# Patient Record
Sex: FEMALE | Race: BLACK OR AFRICAN AMERICAN | URBAN - METROPOLITAN AREA
[De-identification: names, ages, dates, MRNs, and addresses within clinical notes are randomized per-mention and may not be internally consistent; named-entity substitution may affect disease eponyms.]

---

## 2024-06-15 ENCOUNTER — APPOINTMENT (OUTPATIENT)
Dept: CT IMAGING | Age: 39
End: 2024-06-15
Payer: COMMERCIAL

## 2024-06-15 ENCOUNTER — APPOINTMENT (OUTPATIENT)
Dept: GENERAL RADIOLOGY | Age: 39
End: 2024-06-15
Payer: COMMERCIAL

## 2024-06-15 ENCOUNTER — HOSPITAL ENCOUNTER (EMERGENCY)
Age: 39
Discharge: LEFT BEFORE DISCHARGE | End: 2024-06-15
Attending: EMERGENCY MEDICINE
Payer: COMMERCIAL

## 2024-06-15 ENCOUNTER — APPOINTMENT (OUTPATIENT)
Dept: ULTRASOUND IMAGING | Age: 39
End: 2024-06-15
Payer: COMMERCIAL

## 2024-06-15 VITALS
RESPIRATION RATE: 18 BRPM | BODY MASS INDEX: 29.49 KG/M2 | HEART RATE: 61 BPM | HEIGHT: 65 IN | OXYGEN SATURATION: 100 % | WEIGHT: 177 LBS | SYSTOLIC BLOOD PRESSURE: 128 MMHG | TEMPERATURE: 98.7 F | DIASTOLIC BLOOD PRESSURE: 84 MMHG

## 2024-06-15 DIAGNOSIS — Z34.90 PREGNANCY, UNSPECIFIED GESTATIONAL AGE: Primary | ICD-10-CM

## 2024-06-15 DIAGNOSIS — R79.89 ELEVATED D-DIMER: ICD-10-CM

## 2024-06-15 DIAGNOSIS — R00.2 PALPITATIONS: ICD-10-CM

## 2024-06-15 LAB
ABO + RH BLD: NORMAL
ALBUMIN SERPL-MCNC: 3.6 G/DL (ref 3.4–5)
ALBUMIN/GLOB SERPL: 0.8 {RATIO} (ref 1.1–2.2)
ALP SERPL-CCNC: 53 U/L (ref 40–129)
ALT SERPL-CCNC: 9 U/L (ref 10–40)
ANION GAP SERPL CALCULATED.3IONS-SCNC: 10 MMOL/L (ref 3–16)
AST SERPL-CCNC: 28 U/L (ref 15–37)
B-HCG SERPL EIA 3RD IS-ACNC: 9794 MIU/ML
BASOPHILS # BLD: 0 K/UL (ref 0–0.2)
BASOPHILS NFR BLD: 0.8 %
BILIRUB SERPL-MCNC: 0.3 MG/DL (ref 0–1)
BILIRUB UR QL STRIP.AUTO: NEGATIVE
BUN SERPL-MCNC: 8 MG/DL (ref 7–20)
CALCIUM SERPL-MCNC: 8.6 MG/DL (ref 8.3–10.6)
CHLORIDE SERPL-SCNC: 102 MMOL/L (ref 99–110)
CLARITY UR: CLEAR
CO2 SERPL-SCNC: 18 MMOL/L (ref 21–32)
COLOR UR: YELLOW
CREAT SERPL-MCNC: 0.6 MG/DL (ref 0.6–1.1)
D-DIMER QUANTITATIVE: 3.46 UG/ML FEU (ref 0–0.6)
DEPRECATED RDW RBC AUTO: 15.6 % (ref 12.4–15.4)
EOSINOPHIL # BLD: 0 K/UL (ref 0–0.6)
EOSINOPHIL NFR BLD: 0.8 %
GFR SERPLBLD CREATININE-BSD FMLA CKD-EPI: >90 ML/MIN/{1.73_M2}
GLUCOSE SERPL-MCNC: 82 MG/DL (ref 70–99)
GLUCOSE UR STRIP.AUTO-MCNC: NEGATIVE MG/DL
HCG SERPL QL: POSITIVE
HCT VFR BLD AUTO: 34.9 % (ref 36–48)
HGB BLD-MCNC: 11.1 G/DL (ref 12–16)
HGB UR QL STRIP.AUTO: NEGATIVE
KETONES UR STRIP.AUTO-MCNC: NEGATIVE MG/DL
LEUKOCYTE ESTERASE UR QL STRIP.AUTO: NEGATIVE
LIPASE SERPL-CCNC: 27 U/L (ref 13–60)
LYMPHOCYTES # BLD: 1.6 K/UL (ref 1–5.1)
LYMPHOCYTES NFR BLD: 31.9 %
MCH RBC QN AUTO: 25.8 PG (ref 26–34)
MCHC RBC AUTO-ENTMCNC: 32 G/DL (ref 31–36)
MCV RBC AUTO: 80.9 FL (ref 80–100)
MONOCYTES # BLD: 0.7 K/UL (ref 0–1.3)
MONOCYTES NFR BLD: 13.4 %
NEUTROPHILS # BLD: 2.7 K/UL (ref 1.7–7.7)
NEUTROPHILS NFR BLD: 53.1 %
NITRITE UR QL STRIP.AUTO: NEGATIVE
NT-PROBNP SERPL-MCNC: 104 PG/ML (ref 0–124)
PH UR STRIP.AUTO: 6.5 [PH] (ref 5–8)
PLATELET # BLD AUTO: 239 K/UL (ref 135–450)
PMV BLD AUTO: 8.1 FL (ref 5–10.5)
POTASSIUM SERPL-SCNC: 4.4 MMOL/L (ref 3.5–5.1)
PROT SERPL-MCNC: 7.9 G/DL (ref 6.4–8.2)
PROT UR STRIP.AUTO-MCNC: NEGATIVE MG/DL
RBC # BLD AUTO: 4.31 M/UL (ref 4–5.2)
SODIUM SERPL-SCNC: 130 MMOL/L (ref 136–145)
SP GR UR STRIP.AUTO: 1.02 (ref 1–1.03)
TROPONIN, HIGH SENSITIVITY: <6 NG/L (ref 0–14)
TROPONIN, HIGH SENSITIVITY: <6 NG/L (ref 0–14)
UA COMPLETE W REFLEX CULTURE PNL UR: NORMAL
UA DIPSTICK W REFLEX MICRO PNL UR: NORMAL
URN SPEC COLLECT METH UR: NORMAL
UROBILINOGEN UR STRIP-ACNC: 1 E.U./DL
WBC # BLD AUTO: 5 K/UL (ref 4–11)

## 2024-06-15 PROCEDURE — 81003 URINALYSIS AUTO W/O SCOPE: CPT

## 2024-06-15 PROCEDURE — 71045 X-RAY EXAM CHEST 1 VIEW: CPT

## 2024-06-15 PROCEDURE — 76817 TRANSVAGINAL US OBSTETRIC: CPT

## 2024-06-15 PROCEDURE — 86900 BLOOD TYPING SEROLOGIC ABO: CPT

## 2024-06-15 PROCEDURE — 99285 EMERGENCY DEPT VISIT HI MDM: CPT

## 2024-06-15 PROCEDURE — 80053 COMPREHEN METABOLIC PANEL: CPT

## 2024-06-15 PROCEDURE — 83880 ASSAY OF NATRIURETIC PEPTIDE: CPT

## 2024-06-15 PROCEDURE — 84484 ASSAY OF TROPONIN QUANT: CPT

## 2024-06-15 PROCEDURE — 84703 CHORIONIC GONADOTROPIN ASSAY: CPT

## 2024-06-15 PROCEDURE — 86901 BLOOD TYPING SEROLOGIC RH(D): CPT

## 2024-06-15 PROCEDURE — 93005 ELECTROCARDIOGRAM TRACING: CPT | Performed by: EMERGENCY MEDICINE

## 2024-06-15 PROCEDURE — 84702 CHORIONIC GONADOTROPIN TEST: CPT

## 2024-06-15 PROCEDURE — 85379 FIBRIN DEGRADATION QUANT: CPT

## 2024-06-15 PROCEDURE — 85025 COMPLETE CBC W/AUTO DIFF WBC: CPT

## 2024-06-15 PROCEDURE — 83690 ASSAY OF LIPASE: CPT

## 2024-06-15 PROCEDURE — 36415 COLL VENOUS BLD VENIPUNCTURE: CPT

## 2024-06-15 ASSESSMENT — ENCOUNTER SYMPTOMS
VOMITING: 0
ABDOMINAL DISTENTION: 0
COLOR CHANGE: 0
SHORTNESS OF BREATH: 0
DIARRHEA: 0
NAUSEA: 0
RESPIRATORY NEGATIVE: 1
CHEST TIGHTNESS: 0
ABDOMINAL PAIN: 1
CONSTIPATION: 0
COUGH: 0
PHOTOPHOBIA: 0
BACK PAIN: 0

## 2024-06-15 ASSESSMENT — PAIN SCALES - GENERAL: PAINLEVEL_OUTOF10: 5

## 2024-06-15 ASSESSMENT — PAIN - FUNCTIONAL ASSESSMENT: PAIN_FUNCTIONAL_ASSESSMENT: 0-10

## 2024-06-15 ASSESSMENT — LIFESTYLE VARIABLES
HOW OFTEN DO YOU HAVE A DRINK CONTAINING ALCOHOL: NEVER
HOW MANY STANDARD DRINKS CONTAINING ALCOHOL DO YOU HAVE ON A TYPICAL DAY: PATIENT DOES NOT DRINK

## 2024-06-15 NOTE — ED PROVIDER NOTES
[facility] Emergency Department      Pt Name: Alice Greenberg  MRN: [id number]  Birthdate 1985  Date of evaluation: 6/15/2024  Provider: OSWALDO LUNA MD  I personally saw Alice Greenberg and made and approved the management plan with the advanced practice provider.  I take responsibility for the patient management.     HPI: Alice Greenberg presented with   Chief Complaint   Patient presents with    Abdominal Pain     Pt states that she has had abdominal pain for 3 days, no n/v/c/d. Pt states sharp upper abd pain with touch.     Palpitations     Pt states that she has palpitations that has been going on for 3 days as well.      Alice Greenberg has no past medical history on file.  She has no past surgical history on file.    No current facility-administered medications on file prior to encounter.     No current outpatient medications on file prior to encounter.     PHYSICAL EXAM  Vitals: /84   Pulse 61   Temp 98.7 °F (37.1 °C) (Oral)   Resp 18   Ht 1.651 m (5' 5\")   Wt 80.3 kg (177 lb)   LMP 04/14/2024   SpO2 100%   BMI 29.45 kg/m²   Constitutional:  38 y.o. female alert  HENT:  Atraumatic, oral mucosa moist  Neck:  No visible JVD, supple  Chest/Lungs:  Respiratory effort normal, clear, regular  Abdomen:  Non-distended, soft, NT  Back:  No gross deformity  Extremities:  Normal tone and perfusion, no swelling    Medical Decision Making: Briefly, this is an 38 y.o.female who presented with multiple complaints.  She is found to be in first trimester of pregnancy.  She has abnormal D-dimer.  We recommended CT imaging to further assess for the possibility of blood clot.  She also has abnormal x-ray findings of her lung.  She does not have clinical findings suggestive of heart failure.  The patient and her significant other refused the CT scan.  The patient has decided to leave against medical advice.  She has normal mental status and adequate capacity to make medical decisions.  The  was performed with real-time imaging, color flow Doppler imaging, and spectral analysis. COMPARISON: None HISTORY: ORDERING SYSTEM PROVIDED HISTORY: Pregnant and Pelvic Pain TECHNOLOGIST PROVIDED HISTORY: Reason for exam:->Pregnant and Pelvic Pain FINDINGS: Uterus: Measures 9.7 x 6.9 x 6.4 cm.  There is a small 0.9 x 0.7 x 0.7 cm fibroid within the anterior uterine body. Cervical length: Not measured. Gestational Sac(s):  There are two separate gestational sacs identified, with the mean sac diameter of gestational sac A measuring 1.3 cm, and the mean sac diameter of gestational sac B measuring 1.3 cm.  This generates a gestational age of 6 weeks 1 day.  However, gestational sac A is somewhat irregular in shape and demonstrates no evidence of an internal yolk sac or fetal pole. Gestational sac B does demonstrate both an internal fetal pole and yolk sac, with positive fetal cardiac flicker, though fetal heart rate could not be measured on M-mode imaging. Yolk Sac:  Present in gestational sac B Fetal Pole:  Present in gestational sac B [place] Rump Length:  4.0 mm Fetal Heart Rate:  Positive fetal cardiac flicker, though fetal heart rate could not be measured Right ovary: Measures 2.2 x 3.1 x 1.6 cm Left ovary: Measures 4.0 x 2.6 x 2.6 cm Both ovaries demonstrate normal arterial and venous doppler flow, without evidence of torsion, mass, or ectopic pregnancy. Free fluid: None Measurements: Estimated gestational age by current ultrasound: 6 weeks 1 day Estimated gestational age by LMP/prior ultrasound: 5 weeks 5 days Estimated Due Date: 02/07/2025     1. Two separate intrauterine gestational sacs, with gestational sac A being empty and containing no evidence of an internal fetal pole or yolk sac at this time.  Gestational sac B contains both a fetal pole and yolk sac, with the fetal pole demonstrating positive fetal cardiac flicker, though fetal heart rate could not be measured.  The gestational age measures 6 weeks 1

## 2024-06-15 NOTE — ED PROVIDER NOTES
[facility] EMERGENCY DEPARTMENT  EMERGENCY DEPARTMENT ENCOUNTER        Pt Name: Alice Greenberg  MRN: [id number]  Birthdate 1985  Date of evaluation: 6/15/2024  Provider: ETELVINA Ricci  PCP: No primary care provider on file.  Note Started: 12:39 PM EDT 6/15/24       I have seen and evaluated this patient with my supervising physician Katya Oscar,*.      CHIEF COMPLAINT       Chief Complaint   Patient presents with    Abdominal Pain     Pt states that she has had abdominal pain for 3 days, no n/v/c/d. Pt states sharp upper abd pain with touch.     Palpitations     Pt states that she has palpitations that has been going on for 3 days as well.        HISTORY OF PRESENT ILLNESS: 1 or more Elements     History From: Patient  Limitations to history : Language Czech    Alice Greenberg is a 38 y.o. female with no significant past medical history who presents ED with complaint of upper abdominal pain.  She complained of epigastric abdominal pain for the past 3 days.  She reports she has had some palpitations or racing heartbeat.  States she feels dizzy but cannot tell me what she means when she feels dizzy.  She denies any room spinning sensation or feelings of syncope/lightheadedness.  She denies headache.  Denies visual changes or speech disturbances.  Denies any numbness or tingling.  States she could be pregnant.  Denies vaginal bleeding or discharge.  Denies urinary symptoms or changes in bowel movements.  Denies any chest pain or shortness of breath.  Denies any recent travel, trips, surgery or immobilization.  Became concerned and came to the ED for further evaluation and treatment    Nursing Notes were all reviewed and agreed with or any disagreements were addressed in the HPI.    REVIEW OF SYSTEMS :      Review of Systems   Constitutional:  Negative for activity change, appetite change, chills, diaphoresis, fatigue and fever.   Eyes:  Negative for photophobia and  deficit, dysarthria or facial asymmetry.      Sensory: Sensation is intact. No sensory deficit.      Motor: Motor function is intact. No weakness or tremor.      Coordination: Coordination is intact. Coordination normal.      Gait: Gait is intact. Gait normal.   Psychiatric:         Behavior: Behavior normal.         DIAGNOSTIC RESULTS   LABS:    Labs Reviewed   CBC WITH AUTO DIFFERENTIAL - Abnormal; Notable for the following components:       Result Value    Hemoglobin 11.1 (*)     Hematocrit 34.9 (*)     MCH 25.8 (*)     RDW 15.6 (*)     All other components within normal limits   COMPREHENSIVE METABOLIC PANEL W/ REFLEX TO MG FOR LOW K - Abnormal; Notable for the following components:    Sodium 130 (*)     CO2 18 (*)     Albumin/Globulin Ratio 0.8 (*)     ALT 9 (*)     All other components within normal limits   D-DIMER, QUANTITATIVE - Abnormal; Notable for the following components:    D-Dimer, Quant 3.46 (*)     All other components within normal limits   TROPONIN   BRAIN NATRIURETIC PEPTIDE   URINALYSIS WITH REFLEX TO CULTURE   HCG, SERUM, QUALITATIVE   LIPASE   HCG, QUANTITATIVE, PREGNANCY   TROPONIN   ABO/RH       When ordered only abnormal lab results are displayed. All other labs were within normal range or not returned as of this dictation.    EKG: When ordered, EKG's are interpreted by the Emergency Department Physician in the absence of a cardiologist.  Please see their note for interpretation of EKG.    RADIOLOGY:   Non-plain film images such as CT, Ultrasound and MRI are read by the radiologist. Plain radiographic images are visualized and preliminarily interpreted by the ED Provider with the below findings:    Interpretation per the Radiologist below, if available at the time of this note:    US OB TRANSVAGINAL   Final Result   1. Two separate intrauterine gestational sacs, with gestational sac A being   empty and containing no evidence of an internal fetal pole or yolk sac at   this time.  Gestational

## 2024-06-16 LAB
EKG ATRIAL RATE: 67 BPM
EKG DIAGNOSIS: NORMAL
EKG P AXIS: 39 DEGREES
EKG P-R INTERVAL: 164 MS
EKG Q-T INTERVAL: 398 MS
EKG QRS DURATION: 84 MS
EKG QTC CALCULATION (BAZETT): 420 MS
EKG R AXIS: 13 DEGREES
EKG T AXIS: 16 DEGREES
EKG VENTRICULAR RATE: 67 BPM

## 2024-06-16 PROCEDURE — 93010 ELECTROCARDIOGRAM REPORT: CPT | Performed by: INTERNAL MEDICINE

## 2024-08-08 ENCOUNTER — APPOINTMENT (OUTPATIENT)
Dept: ULTRASOUND IMAGING | Age: 39
End: 2024-08-08
Payer: COMMERCIAL

## 2024-08-08 ENCOUNTER — HOSPITAL ENCOUNTER (EMERGENCY)
Age: 39
Discharge: HOME | End: 2024-08-08
Payer: COMMERCIAL

## 2024-08-08 VITALS
OXYGEN SATURATION: 100 % | HEART RATE: 71 BPM | SYSTOLIC BLOOD PRESSURE: 131 MMHG | RESPIRATION RATE: 18 BRPM | DIASTOLIC BLOOD PRESSURE: 87 MMHG | TEMPERATURE: 97.8 F

## 2024-08-08 DIAGNOSIS — O02.1 MISSED ABORTION: Primary | ICD-10-CM

## 2024-08-08 LAB
ABO + RH BLD: NORMAL
ALBUMIN SERPL-MCNC: 3.8 G/DL (ref 3.4–5)
ALBUMIN/GLOB SERPL: 0.9 {RATIO} (ref 1.1–2.2)
ALP SERPL-CCNC: 54 U/L (ref 40–129)
ALT SERPL-CCNC: 9 U/L (ref 10–40)
ANION GAP SERPL CALCULATED.3IONS-SCNC: 10 MMOL/L (ref 3–16)
AST SERPL-CCNC: 15 U/L (ref 15–37)
B-HCG SERPL EIA 3RD IS-ACNC: 2253 MIU/ML
BACTERIA URNS QL MICRO: ABNORMAL /HPF
BASOPHILS # BLD: 0 K/UL (ref 0–0.2)
BASOPHILS NFR BLD: 0.5 %
BILIRUB SERPL-MCNC: <0.2 MG/DL (ref 0–1)
BILIRUB UR QL STRIP.AUTO: NEGATIVE
BUN SERPL-MCNC: 7 MG/DL (ref 7–20)
CALCIUM SERPL-MCNC: 8.6 MG/DL (ref 8.3–10.6)
CHLORIDE SERPL-SCNC: 101 MMOL/L (ref 99–110)
CLARITY UR: CLEAR
CO2 SERPL-SCNC: 23 MMOL/L (ref 21–32)
COLOR UR: YELLOW
CREAT SERPL-MCNC: <0.5 MG/DL (ref 0.6–1.1)
DEPRECATED RDW RBC AUTO: 14.9 % (ref 12.4–15.4)
EOSINOPHIL # BLD: 0.1 K/UL (ref 0–0.6)
EOSINOPHIL NFR BLD: 2.8 %
EPI CELLS #/AREA URNS AUTO: 1 /HPF (ref 0–5)
GFR SERPLBLD CREATININE-BSD FMLA CKD-EPI: >90 ML/MIN/{1.73_M2}
GLUCOSE SERPL-MCNC: 91 MG/DL (ref 70–99)
GLUCOSE UR STRIP.AUTO-MCNC: NEGATIVE MG/DL
HCT VFR BLD AUTO: 35 % (ref 36–48)
HGB BLD-MCNC: 11.4 G/DL (ref 12–16)
HGB UR QL STRIP.AUTO: ABNORMAL
HYALINE CASTS #/AREA URNS AUTO: 0 /LPF (ref 0–8)
KETONES UR STRIP.AUTO-MCNC: NEGATIVE MG/DL
LEUKOCYTE ESTERASE UR QL STRIP.AUTO: ABNORMAL
LIPASE SERPL-CCNC: 25 U/L (ref 13–60)
LYMPHOCYTES # BLD: 1.4 K/UL (ref 1–5.1)
LYMPHOCYTES NFR BLD: 27 %
MCH RBC QN AUTO: 26.4 PG (ref 26–34)
MCHC RBC AUTO-ENTMCNC: 32.6 G/DL (ref 31–36)
MCV RBC AUTO: 80.9 FL (ref 80–100)
MONOCYTES # BLD: 0.6 K/UL (ref 0–1.3)
MONOCYTES NFR BLD: 10.9 %
NEUTROPHILS # BLD: 3 K/UL (ref 1.7–7.7)
NEUTROPHILS NFR BLD: 58.8 %
NITRITE UR QL STRIP.AUTO: NEGATIVE
PH UR STRIP.AUTO: 6 [PH] (ref 5–8)
PLATELET # BLD AUTO: 198 K/UL (ref 135–450)
PLATELET BLD QL SMEAR: ADEQUATE
PMV BLD AUTO: 8 FL (ref 5–10.5)
POTASSIUM SERPL-SCNC: 4 MMOL/L (ref 3.5–5.1)
PROT SERPL-MCNC: 7.9 G/DL (ref 6.4–8.2)
PROT UR STRIP.AUTO-MCNC: NEGATIVE MG/DL
RBC # BLD AUTO: 4.33 M/UL (ref 4–5.2)
RBC CLUMPS #/AREA URNS AUTO: 62 /HPF (ref 0–4)
SLIDE REVIEW: ABNORMAL
SODIUM SERPL-SCNC: 134 MMOL/L (ref 136–145)
SP GR UR STRIP.AUTO: 1.02 (ref 1–1.03)
UA COMPLETE W REFLEX CULTURE PNL UR: ABNORMAL
UA DIPSTICK W REFLEX MICRO PNL UR: YES
URN SPEC COLLECT METH UR: ABNORMAL
UROBILINOGEN UR STRIP-ACNC: 1 E.U./DL
WBC # BLD AUTO: 5.1 K/UL (ref 4–11)
WBC #/AREA URNS AUTO: 2 /HPF (ref 0–5)

## 2024-08-08 PROCEDURE — 84702 CHORIONIC GONADOTROPIN TEST: CPT

## 2024-08-08 PROCEDURE — 86901 BLOOD TYPING SEROLOGIC RH(D): CPT

## 2024-08-08 PROCEDURE — 80053 COMPREHEN METABOLIC PANEL: CPT

## 2024-08-08 PROCEDURE — 83690 ASSAY OF LIPASE: CPT

## 2024-08-08 PROCEDURE — 99284 EMERGENCY DEPT VISIT MOD MDM: CPT

## 2024-08-08 PROCEDURE — 81001 URINALYSIS AUTO W/SCOPE: CPT

## 2024-08-08 PROCEDURE — 76801 OB US < 14 WKS SINGLE FETUS: CPT

## 2024-08-08 PROCEDURE — 86900 BLOOD TYPING SEROLOGIC ABO: CPT

## 2024-08-08 PROCEDURE — 85025 COMPLETE CBC W/AUTO DIFF WBC: CPT

## 2024-08-08 ASSESSMENT — ENCOUNTER SYMPTOMS
WHEEZING: 0
COUGH: 0
NAUSEA: 0
VOMITING: 0
DIARRHEA: 0
ABDOMINAL PAIN: 1
RHINORRHEA: 0
SHORTNESS OF BREATH: 0

## 2024-08-08 ASSESSMENT — PAIN SCALES - GENERAL: PAINLEVEL_OUTOF10: 7

## 2024-08-08 ASSESSMENT — PAIN - FUNCTIONAL ASSESSMENT: PAIN_FUNCTIONAL_ASSESSMENT: 0-10

## 2024-08-08 NOTE — ED PROVIDER NOTES
with fetal demise.  I did consult with OB/GYN, Dr. Mendiola, who states the patient can follow-up outpatient.  She has an appointment already scheduled for Tuesday.    I see nothing that would suggest an acute abdomen at this time. Based on history, physical exam, risk factors, and tests my suspicion for bowel obstruction, incarcerated hernia, acute pancreatitis, intra-abdominal abscess, perforated viscus, diverticulitis, cholecystitis, appendicitis, PID, ovarian torsion, ectopic pregnancy and tubo-ovarian abscess is very low. There is no evidence of peritonitis, sepsis or toxicity at this time. I feel the patient can be managed as an outpatient with follow-up with her family doctor in 24-48 hours. Instructions have been given for the patient to return to the ED for worsening of the pain, high fevers, intractable vomiting, or persistent heavy bleeding or symptomatic anemia.  She is agreeable to this plan and stable for discharge at this time.      I am the Primary Clinician of Record.  FINAL IMPRESSION      1. Missed           DISPOSITION/PLAN     DISPOSITION Decision To Discharge 2024 01:19:01 PM      PATIENT REFERRED TO:  Jolene Mendiola MD  [address] [facility] [address]  [phone]    Schedule an appointment as soon as possible for a visit       [facility] Emergency Department  [address] [address]  [phone]  Go to   If symptoms worsen      DISCHARGE MEDICATIONS:  New Prescriptions    No medications on file       DISCONTINUED MEDICATIONS:  Discontinued Medications    No medications on file              (Please note that portions of this note were completed with a voice recognition program.  Efforts were made to edit the dictations but occasionally words are mis-transcribed.)    Kaity Sim PA-C (electronically signed)    ]     Kaity Sim PA-C  [id number]

## 2024-08-10 ENCOUNTER — ANESTHESIA (OUTPATIENT)
Dept: OPERATING ROOM | Age: 39
End: 2024-08-10
Payer: COMMERCIAL

## 2024-08-10 ENCOUNTER — ANESTHESIA EVENT (OUTPATIENT)
Dept: OPERATING ROOM | Age: 39
End: 2024-08-10
Payer: COMMERCIAL

## 2024-08-10 ENCOUNTER — HOSPITAL ENCOUNTER (OUTPATIENT)
Age: 39
Discharge: HOME | End: 2024-08-10
Attending: EMERGENCY MEDICINE | Admitting: OBSTETRICS & GYNECOLOGY
Payer: COMMERCIAL

## 2024-08-10 ENCOUNTER — HOSPITAL ENCOUNTER (EMERGENCY)
Age: 39
Discharge: ANOTHER HOSPITAL | End: 2024-08-10
Attending: EMERGENCY MEDICINE
Payer: COMMERCIAL

## 2024-08-10 VITALS
DIASTOLIC BLOOD PRESSURE: 82 MMHG | OXYGEN SATURATION: 100 % | TEMPERATURE: 97.9 F | SYSTOLIC BLOOD PRESSURE: 111 MMHG | HEART RATE: 88 BPM | RESPIRATION RATE: 15 BRPM

## 2024-08-10 VITALS
BODY MASS INDEX: 36.03 KG/M2 | SYSTOLIC BLOOD PRESSURE: 93 MMHG | HEART RATE: 77 BPM | HEIGHT: 65 IN | OXYGEN SATURATION: 100 % | WEIGHT: 216.27 LBS | TEMPERATURE: 98.4 F | DIASTOLIC BLOOD PRESSURE: 59 MMHG | RESPIRATION RATE: 16 BRPM

## 2024-08-10 DIAGNOSIS — N93.9 UTERINE HEMORRHAGE: ICD-10-CM

## 2024-08-10 DIAGNOSIS — O03.9 MISCARRIAGE: Primary | ICD-10-CM

## 2024-08-10 DIAGNOSIS — O46.90 VAGINAL BLEEDING IN PREGNANCY: ICD-10-CM

## 2024-08-10 DIAGNOSIS — O03.4 INCOMPLETE ABORTION: Primary | ICD-10-CM

## 2024-08-10 DIAGNOSIS — O02.1 MISSED AB: ICD-10-CM

## 2024-08-10 PROBLEM — Z98.890 S/P DILATATION AND CURETTAGE: Status: ACTIVE | Noted: 2024-08-10

## 2024-08-10 LAB
ABO + RH BLD: NORMAL
ABO + RH BLD: NORMAL
ALBUMIN SERPL-MCNC: 3.8 G/DL (ref 3.4–5)
ALBUMIN/GLOB SERPL: 1 {RATIO} (ref 1.1–2.2)
ALP SERPL-CCNC: 55 U/L (ref 40–129)
ALT SERPL-CCNC: 8 U/L (ref 10–40)
ANION GAP SERPL CALCULATED.3IONS-SCNC: 12 MMOL/L (ref 3–16)
APTT BLD: 26.4 SEC (ref 22.1–36.4)
AST SERPL-CCNC: 20 U/L (ref 15–37)
B-HCG SERPL EIA 3RD IS-ACNC: 1118 MIU/ML
BASOPHILS # BLD: 0 K/UL (ref 0–0.2)
BASOPHILS # BLD: 0 K/UL (ref 0–0.2)
BASOPHILS # BLD: 0.1 K/UL (ref 0–0.2)
BASOPHILS NFR BLD: 0.2 %
BASOPHILS NFR BLD: 0.2 %
BASOPHILS NFR BLD: 1.1 %
BILIRUB SERPL-MCNC: <0.2 MG/DL (ref 0–1)
BLD GP AB SCN SERPL QL: NORMAL
BLD GP AB SCN SERPL QL: NORMAL
BUN SERPL-MCNC: 7 MG/DL (ref 7–20)
CALCIUM SERPL-MCNC: 8.7 MG/DL (ref 8.3–10.6)
CHLORIDE SERPL-SCNC: 101 MMOL/L (ref 99–110)
CO2 SERPL-SCNC: 19 MMOL/L (ref 21–32)
CREAT SERPL-MCNC: 0.5 MG/DL (ref 0.6–1.1)
DEPRECATED RDW RBC AUTO: 14.8 % (ref 12.4–15.4)
DEPRECATED RDW RBC AUTO: 15.1 % (ref 12.4–15.4)
DEPRECATED RDW RBC AUTO: 15.7 % (ref 12.4–15.4)
EOSINOPHIL # BLD: 0 K/UL (ref 0–0.6)
EOSINOPHIL # BLD: 0.1 K/UL (ref 0–0.6)
EOSINOPHIL # BLD: 0.1 K/UL (ref 0–0.6)
EOSINOPHIL NFR BLD: 0 %
EOSINOPHIL NFR BLD: 0.7 %
EOSINOPHIL NFR BLD: 1.2 %
GFR SERPLBLD CREATININE-BSD FMLA CKD-EPI: >90 ML/MIN/{1.73_M2}
GLUCOSE SERPL-MCNC: 93 MG/DL (ref 70–99)
HCT VFR BLD AUTO: 30.9 % (ref 36–48)
HCT VFR BLD AUTO: 31.5 % (ref 36–48)
HCT VFR BLD AUTO: 33.2 % (ref 36–48)
HCT VFR BLD AUTO: 34.8 % (ref 36–48)
HCT VFR BLD AUTO: 35.1 % (ref 36–48)
HGB BLD-MCNC: 10.3 G/DL (ref 12–16)
HGB BLD-MCNC: 10.5 G/DL (ref 12–16)
HGB BLD-MCNC: 10.8 G/DL (ref 12–16)
HGB BLD-MCNC: 11.1 G/DL (ref 12–16)
HGB BLD-MCNC: 11.2 G/DL (ref 12–16)
INR PPP: 1.09 (ref 0.85–1.15)
LYMPHOCYTES # BLD: 0.9 K/UL (ref 1–5.1)
LYMPHOCYTES # BLD: 1.5 K/UL (ref 1–5.1)
LYMPHOCYTES # BLD: 1.6 K/UL (ref 1–5.1)
LYMPHOCYTES NFR BLD: 14.9 %
LYMPHOCYTES NFR BLD: 23.5 %
LYMPHOCYTES NFR BLD: 7.6 %
MCH RBC QN AUTO: 26.2 PG (ref 26–34)
MCH RBC QN AUTO: 27.2 PG (ref 26–34)
MCH RBC QN AUTO: 27.7 PG (ref 26–34)
MCHC RBC AUTO-ENTMCNC: 31.8 G/DL (ref 31–36)
MCHC RBC AUTO-ENTMCNC: 33.3 G/DL (ref 31–36)
MCHC RBC AUTO-ENTMCNC: 33.3 G/DL (ref 31–36)
MCV RBC AUTO: 81.6 FL (ref 80–100)
MCV RBC AUTO: 82.4 FL (ref 80–100)
MCV RBC AUTO: 83.2 FL (ref 80–100)
MONOCYTES # BLD: 0.3 K/UL (ref 0–1.3)
MONOCYTES # BLD: 0.8 K/UL (ref 0–1.3)
MONOCYTES # BLD: 0.9 K/UL (ref 0–1.3)
MONOCYTES NFR BLD: 12.3 %
MONOCYTES NFR BLD: 2.7 %
MONOCYTES NFR BLD: 8.7 %
NEUTROPHILS # BLD: 10.1 K/UL (ref 1.7–7.7)
NEUTROPHILS # BLD: 4.1 K/UL (ref 1.7–7.7)
NEUTROPHILS # BLD: 7.8 K/UL (ref 1.7–7.7)
NEUTROPHILS NFR BLD: 61.9 %
NEUTROPHILS NFR BLD: 75.5 %
NEUTROPHILS NFR BLD: 89.5 %
PLATELET # BLD AUTO: 159 K/UL (ref 135–450)
PLATELET # BLD AUTO: 211 K/UL (ref 135–450)
PLATELET # BLD AUTO: 216 K/UL (ref 135–450)
PMV BLD AUTO: 7.8 FL (ref 5–10.5)
PMV BLD AUTO: 8.2 FL (ref 5–10.5)
PMV BLD AUTO: 8.3 FL (ref 5–10.5)
POTASSIUM SERPL-SCNC: 4.1 MMOL/L (ref 3.5–5.1)
PROT SERPL-MCNC: 7.8 G/DL (ref 6.4–8.2)
PROTHROMBIN TIME: 14.3 SEC (ref 11.9–14.9)
RBC # BLD AUTO: 3.79 M/UL (ref 4–5.2)
RBC # BLD AUTO: 3.79 M/UL (ref 4–5.2)
RBC # BLD AUTO: 4.27 M/UL (ref 4–5.2)
SODIUM SERPL-SCNC: 132 MMOL/L (ref 136–145)
WBC # BLD AUTO: 10.3 K/UL (ref 4–11)
WBC # BLD AUTO: 11.3 K/UL (ref 4–11)
WBC # BLD AUTO: 6.6 K/UL (ref 4–11)

## 2024-08-10 PROCEDURE — 2580000003 HC RX 258: Performed by: EMERGENCY MEDICINE

## 2024-08-10 PROCEDURE — 96360 HYDRATION IV INFUSION INIT: CPT

## 2024-08-10 PROCEDURE — 3600000002 HC SURGERY LEVEL 2 BASE: Performed by: OBSTETRICS & GYNECOLOGY

## 2024-08-10 PROCEDURE — 86923 COMPATIBILITY TEST ELECTRIC: CPT

## 2024-08-10 PROCEDURE — 85025 COMPLETE CBC W/AUTO DIFF WBC: CPT

## 2024-08-10 PROCEDURE — 85610 PROTHROMBIN TIME: CPT

## 2024-08-10 PROCEDURE — 6360000002 HC RX W HCPCS

## 2024-08-10 PROCEDURE — 2580000003 HC RX 258: Performed by: NURSE ANESTHETIST, CERTIFIED REGISTERED

## 2024-08-10 PROCEDURE — 6360000002 HC RX W HCPCS: Performed by: NURSE ANESTHETIST, CERTIFIED REGISTERED

## 2024-08-10 PROCEDURE — 6360000002 HC RX W HCPCS: Performed by: EMERGENCY MEDICINE

## 2024-08-10 PROCEDURE — 2580000003 HC RX 258: Performed by: OBSTETRICS & GYNECOLOGY

## 2024-08-10 PROCEDURE — 6360000002 HC RX W HCPCS: Performed by: ANESTHESIOLOGY

## 2024-08-10 PROCEDURE — 86900 BLOOD TYPING SEROLOGIC ABO: CPT

## 2024-08-10 PROCEDURE — 2709999900 HC NON-CHARGEABLE SUPPLY: Performed by: OBSTETRICS & GYNECOLOGY

## 2024-08-10 PROCEDURE — 2500000003 HC RX 250 WO HCPCS: Performed by: OBSTETRICS & GYNECOLOGY

## 2024-08-10 PROCEDURE — 3700000000 HC ANESTHESIA ATTENDED CARE: Performed by: OBSTETRICS & GYNECOLOGY

## 2024-08-10 PROCEDURE — 86901 BLOOD TYPING SEROLOGIC RH(D): CPT

## 2024-08-10 PROCEDURE — 88305 TISSUE EXAM BY PATHOLOGIST: CPT

## 2024-08-10 PROCEDURE — 80053 COMPREHEN METABOLIC PANEL: CPT

## 2024-08-10 PROCEDURE — 7100000001 HC PACU RECOVERY - ADDTL 15 MIN: Performed by: OBSTETRICS & GYNECOLOGY

## 2024-08-10 PROCEDURE — 7100000000 HC PACU RECOVERY - FIRST 15 MIN: Performed by: OBSTETRICS & GYNECOLOGY

## 2024-08-10 PROCEDURE — 96374 THER/PROPH/DIAG INJ IV PUSH: CPT

## 2024-08-10 PROCEDURE — 85014 HEMATOCRIT: CPT

## 2024-08-10 PROCEDURE — A4217 STERILE WATER/SALINE, 500 ML: HCPCS | Performed by: OBSTETRICS & GYNECOLOGY

## 2024-08-10 PROCEDURE — 36415 COLL VENOUS BLD VENIPUNCTURE: CPT

## 2024-08-10 PROCEDURE — 36430 TRANSFUSION BLD/BLD COMPNT: CPT

## 2024-08-10 PROCEDURE — 96375 TX/PRO/DX INJ NEW DRUG ADDON: CPT

## 2024-08-10 PROCEDURE — 99285 EMERGENCY DEPT VISIT HI MDM: CPT

## 2024-08-10 PROCEDURE — 3600000012 HC SURGERY LEVEL 2 ADDTL 15MIN: Performed by: OBSTETRICS & GYNECOLOGY

## 2024-08-10 PROCEDURE — 86850 RBC ANTIBODY SCREEN: CPT

## 2024-08-10 PROCEDURE — 2500000003 HC RX 250 WO HCPCS

## 2024-08-10 PROCEDURE — 6370000000 HC RX 637 (ALT 250 FOR IP): Performed by: OBSTETRICS & GYNECOLOGY

## 2024-08-10 PROCEDURE — 85018 HEMOGLOBIN: CPT

## 2024-08-10 PROCEDURE — 85730 THROMBOPLASTIN TIME PARTIAL: CPT

## 2024-08-10 PROCEDURE — P9016 RBC LEUKOCYTES REDUCED: HCPCS

## 2024-08-10 PROCEDURE — 88300 SURGICAL PATH GROSS: CPT

## 2024-08-10 PROCEDURE — 84702 CHORIONIC GONADOTROPIN TEST: CPT

## 2024-08-10 PROCEDURE — 3700000001 HC ADD 15 MINUTES (ANESTHESIA): Performed by: OBSTETRICS & GYNECOLOGY

## 2024-08-10 PROCEDURE — 94760 N-INVAS EAR/PLS OXIMETRY 1: CPT

## 2024-08-10 PROCEDURE — 2500000003 HC RX 250 WO HCPCS: Performed by: NURSE ANESTHETIST, CERTIFIED REGISTERED

## 2024-08-10 RX ORDER — NOREPINEPHRINE BITARTRATE 0.06 MG/ML
INJECTION, SOLUTION INTRAVENOUS
Status: DISCONTINUED
Start: 2024-08-10 | End: 2024-08-10 | Stop reason: HOSPADM

## 2024-08-10 RX ORDER — ONDANSETRON 2 MG/ML
4 INJECTION INTRAMUSCULAR; INTRAVENOUS
Status: COMPLETED | OUTPATIENT
Start: 2024-08-10 | End: 2024-08-10

## 2024-08-10 RX ORDER — MISOPROSTOL 200 UG/1
TABLET ORAL
Status: DISCONTINUED
Start: 2024-08-10 | End: 2024-08-10

## 2024-08-10 RX ORDER — NALOXONE HYDROCHLORIDE 0.4 MG/ML
INJECTION, SOLUTION INTRAMUSCULAR; INTRAVENOUS; SUBCUTANEOUS PRN
Status: DISCONTINUED | OUTPATIENT
Start: 2024-08-10 | End: 2024-08-10 | Stop reason: HOSPADM

## 2024-08-10 RX ORDER — MISOPROSTOL 100 UG/1
TABLET ORAL
Status: COMPLETED | OUTPATIENT
Start: 2024-08-10 | End: 2024-08-10

## 2024-08-10 RX ORDER — SODIUM CHLORIDE 9 MG/ML
INJECTION, SOLUTION INTRAVENOUS PRN
Status: DISCONTINUED | OUTPATIENT
Start: 2024-08-10 | End: 2024-08-10 | Stop reason: HOSPADM

## 2024-08-10 RX ORDER — ONDANSETRON 4 MG/1
4 TABLET, ORALLY DISINTEGRATING ORAL EVERY 8 HOURS PRN
Status: DISCONTINUED | OUTPATIENT
Start: 2024-08-10 | End: 2024-08-10 | Stop reason: HOSPADM

## 2024-08-10 RX ORDER — ONDANSETRON 2 MG/ML
INJECTION INTRAMUSCULAR; INTRAVENOUS PRN
Status: DISCONTINUED | OUTPATIENT
Start: 2024-08-10 | End: 2024-08-10 | Stop reason: SDUPTHER

## 2024-08-10 RX ORDER — EPHEDRINE SULFATE/0.9% NACL/PF 25 MG/5 ML
SYRINGE (ML) INTRAVENOUS PRN
Status: DISCONTINUED | OUTPATIENT
Start: 2024-08-10 | End: 2024-08-10 | Stop reason: SDUPTHER

## 2024-08-10 RX ORDER — ETOMIDATE 2 MG/ML
INJECTION INTRAVENOUS PRN
Status: DISCONTINUED | OUTPATIENT
Start: 2024-08-10 | End: 2024-08-10 | Stop reason: SDUPTHER

## 2024-08-10 RX ORDER — ONDANSETRON 2 MG/ML
4 INJECTION INTRAMUSCULAR; INTRAVENOUS EVERY 6 HOURS PRN
Status: DISCONTINUED | OUTPATIENT
Start: 2024-08-10 | End: 2024-08-10 | Stop reason: HOSPADM

## 2024-08-10 RX ORDER — DEXAMETHASONE SODIUM PHOSPHATE 4 MG/ML
INJECTION, SOLUTION INTRA-ARTICULAR; INTRALESIONAL; INTRAMUSCULAR; INTRAVENOUS; SOFT TISSUE PRN
Status: DISCONTINUED | OUTPATIENT
Start: 2024-08-10 | End: 2024-08-10 | Stop reason: SDUPTHER

## 2024-08-10 RX ORDER — MAGNESIUM HYDROXIDE 1200 MG/15ML
LIQUID ORAL CONTINUOUS PRN
Status: COMPLETED | OUTPATIENT
Start: 2024-08-10 | End: 2024-08-10

## 2024-08-10 RX ORDER — 0.9 % SODIUM CHLORIDE 0.9 %
500 INTRAVENOUS SOLUTION INTRAVENOUS ONCE
Status: DISCONTINUED | OUTPATIENT
Start: 2024-08-10 | End: 2024-08-10

## 2024-08-10 RX ORDER — METHYLERGONOVINE MALEATE 0.2 MG/ML
INJECTION INTRAVENOUS PRN
Status: DISCONTINUED | OUTPATIENT
Start: 2024-08-10 | End: 2024-08-10 | Stop reason: SDUPTHER

## 2024-08-10 RX ORDER — SODIUM CHLORIDE 0.9 % (FLUSH) 0.9 %
5-40 SYRINGE (ML) INJECTION EVERY 12 HOURS SCHEDULED
Status: DISCONTINUED | OUTPATIENT
Start: 2024-08-10 | End: 2024-08-10 | Stop reason: HOSPADM

## 2024-08-10 RX ORDER — 0.9 % SODIUM CHLORIDE 0.9 %
1000 INTRAVENOUS SOLUTION INTRAVENOUS ONCE
Status: DISCONTINUED | OUTPATIENT
Start: 2024-08-10 | End: 2024-08-10 | Stop reason: HOSPADM

## 2024-08-10 RX ORDER — SODIUM CHLORIDE 0.9 % (FLUSH) 0.9 %
5-40 SYRINGE (ML) INJECTION PRN
Status: DISCONTINUED | OUTPATIENT
Start: 2024-08-10 | End: 2024-08-10 | Stop reason: HOSPADM

## 2024-08-10 RX ORDER — PHENYLEPHRINE HCL IN 0.9% NACL 1 MG/10 ML
SYRINGE (ML) INTRAVENOUS PRN
Status: DISCONTINUED | OUTPATIENT
Start: 2024-08-10 | End: 2024-08-10 | Stop reason: SDUPTHER

## 2024-08-10 RX ORDER — MORPHINE SULFATE 4 MG/ML
4 INJECTION, SOLUTION INTRAMUSCULAR; INTRAVENOUS ONCE
Status: COMPLETED | OUTPATIENT
Start: 2024-08-10 | End: 2024-08-10

## 2024-08-10 RX ORDER — OXYTOCIN 10 [USP'U]/ML
INJECTION, SOLUTION INTRAMUSCULAR; INTRAVENOUS PRN
Status: DISCONTINUED | OUTPATIENT
Start: 2024-08-10 | End: 2024-08-10 | Stop reason: SDUPTHER

## 2024-08-10 RX ORDER — ONDANSETRON 2 MG/ML
INJECTION INTRAMUSCULAR; INTRAVENOUS
Status: COMPLETED
Start: 2024-08-10 | End: 2024-08-10

## 2024-08-10 RX ORDER — TRANEXAMIC ACID 100 MG/ML
INJECTION, SOLUTION INTRAVENOUS
Status: COMPLETED
Start: 2024-08-10 | End: 2024-08-10

## 2024-08-10 RX ORDER — SODIUM CHLORIDE 9 MG/ML
INJECTION, SOLUTION INTRAVENOUS CONTINUOUS PRN
Status: DISCONTINUED | OUTPATIENT
Start: 2024-08-10 | End: 2024-08-10 | Stop reason: SDUPTHER

## 2024-08-10 RX ORDER — ONDANSETRON 2 MG/ML
4 INJECTION INTRAMUSCULAR; INTRAVENOUS ONCE
Status: COMPLETED | OUTPATIENT
Start: 2024-08-10 | End: 2024-08-10

## 2024-08-10 RX ORDER — FENTANYL CITRATE 0.05 MG/ML
25 INJECTION, SOLUTION INTRAMUSCULAR; INTRAVENOUS EVERY 5 MIN PRN
Status: DISCONTINUED | OUTPATIENT
Start: 2024-08-10 | End: 2024-08-10 | Stop reason: HOSPADM

## 2024-08-10 RX ORDER — SODIUM CHLORIDE 9 MG/ML
INJECTION, SOLUTION INTRAVENOUS CONTINUOUS
Status: DISCONTINUED | OUTPATIENT
Start: 2024-08-10 | End: 2024-08-10 | Stop reason: HOSPADM

## 2024-08-10 RX ORDER — FENTANYL CITRATE 50 UG/ML
INJECTION, SOLUTION INTRAMUSCULAR; INTRAVENOUS PRN
Status: DISCONTINUED | OUTPATIENT
Start: 2024-08-10 | End: 2024-08-10 | Stop reason: SDUPTHER

## 2024-08-10 RX ORDER — SUCCINYLCHOLINE/SOD CL,ISO/PF 200MG/10ML
SYRINGE (ML) INTRAVENOUS PRN
Status: DISCONTINUED | OUTPATIENT
Start: 2024-08-10 | End: 2024-08-10 | Stop reason: SDUPTHER

## 2024-08-10 RX ORDER — LIDOCAINE HYDROCHLORIDE 20 MG/ML
INJECTION, SOLUTION INFILTRATION; PERINEURAL PRN
Status: DISCONTINUED | OUTPATIENT
Start: 2024-08-10 | End: 2024-08-10 | Stop reason: SDUPTHER

## 2024-08-10 RX ORDER — ACETAMINOPHEN 500 MG
1000 TABLET ORAL EVERY 8 HOURS PRN
Status: DISCONTINUED | OUTPATIENT
Start: 2024-08-10 | End: 2024-08-10 | Stop reason: HOSPADM

## 2024-08-10 RX ORDER — PROPOFOL 10 MG/ML
INJECTION, EMULSION INTRAVENOUS PRN
Status: DISCONTINUED | OUTPATIENT
Start: 2024-08-10 | End: 2024-08-10 | Stop reason: SDUPTHER

## 2024-08-10 RX ADMIN — Medication 100 MCG: at 13:56

## 2024-08-10 RX ADMIN — ONDANSETRON 4 MG: 2 INJECTION INTRAMUSCULAR; INTRAVENOUS at 14:40

## 2024-08-10 RX ADMIN — PROPOFOL 40 MG: 10 INJECTION, EMULSION INTRAVENOUS at 13:41

## 2024-08-10 RX ADMIN — EPHEDRINE SULFATE 5 MG: 5 INJECTION INTRAVENOUS at 13:40

## 2024-08-10 RX ADMIN — TRANEXAMIC ACID 1000 MG: 100 INJECTION, SOLUTION INTRAVENOUS at 10:35

## 2024-08-10 RX ADMIN — DOXYCYCLINE 200 MG: 100 INJECTION, POWDER, LYOPHILIZED, FOR SOLUTION INTRAVENOUS at 14:38

## 2024-08-10 RX ADMIN — ETOMIDATE 8 MG: 2 INJECTION INTRAVENOUS at 13:41

## 2024-08-10 RX ADMIN — OXYTOCIN 30 UNITS: 10 INJECTION INTRAVENOUS at 13:53

## 2024-08-10 RX ADMIN — EPHEDRINE SULFATE 5 MG: 5 INJECTION INTRAVENOUS at 13:35

## 2024-08-10 RX ADMIN — METHYLERGONOVINE MALEATE 200 MCG: 0.2 INJECTION, SOLUTION INTRAMUSCULAR; INTRAVENOUS at 13:51

## 2024-08-10 RX ADMIN — EPHEDRINE SULFATE 5 MG: 5 INJECTION INTRAVENOUS at 13:23

## 2024-08-10 RX ADMIN — FENTANYL CITRATE 25 MCG: 50 INJECTION INTRAMUSCULAR; INTRAVENOUS at 13:39

## 2024-08-10 RX ADMIN — Medication 200 MCG: at 13:42

## 2024-08-10 RX ADMIN — ONDANSETRON 4 MG: 2 INJECTION INTRAMUSCULAR; INTRAVENOUS at 11:07

## 2024-08-10 RX ADMIN — FENTANYL CITRATE 25 MCG: 0.05 INJECTION, SOLUTION INTRAMUSCULAR; INTRAVENOUS at 14:44

## 2024-08-10 RX ADMIN — LIDOCAINE HYDROCHLORIDE 50 MG: 20 INJECTION, SOLUTION INFILTRATION; PERINEURAL at 13:40

## 2024-08-10 RX ADMIN — MORPHINE SULFATE 4 MG: 4 INJECTION, SOLUTION INTRAMUSCULAR; INTRAVENOUS at 11:07

## 2024-08-10 RX ADMIN — ACETAMINOPHEN 1000 MG: 500 TABLET ORAL at 15:26

## 2024-08-10 RX ADMIN — SODIUM CHLORIDE: 9 INJECTION, SOLUTION INTRAVENOUS at 13:37

## 2024-08-10 RX ADMIN — FENTANYL CITRATE 25 MCG: 50 INJECTION INTRAMUSCULAR; INTRAVENOUS at 13:59

## 2024-08-10 RX ADMIN — Medication 160 MG: at 13:41

## 2024-08-10 RX ADMIN — DEXAMETHASONE SODIUM PHOSPHATE 8 MG: 4 INJECTION, SOLUTION INTRAMUSCULAR; INTRAVENOUS at 13:46

## 2024-08-10 RX ADMIN — ONDANSETRON 4 MG: 2 INJECTION INTRAMUSCULAR; INTRAVENOUS at 14:00

## 2024-08-10 RX ADMIN — SODIUM CHLORIDE: 9 INJECTION, SOLUTION INTRAVENOUS at 12:40

## 2024-08-10 ASSESSMENT — ENCOUNTER SYMPTOMS
SHORTNESS OF BREATH: 0
SORE THROAT: 0
BACK PAIN: 0
RESPIRATORY NEGATIVE: 1
ABDOMINAL PAIN: 1

## 2024-08-10 ASSESSMENT — PAIN - FUNCTIONAL ASSESSMENT
PAIN_FUNCTIONAL_ASSESSMENT: ADULT NONVERBAL PAIN SCALE (NPVS)
PAIN_FUNCTIONAL_ASSESSMENT: PREVENTS OR INTERFERES SOME ACTIVE ACTIVITIES AND ADLS
PAIN_FUNCTIONAL_ASSESSMENT: PREVENTS OR INTERFERES SOME ACTIVE ACTIVITIES AND ADLS

## 2024-08-10 ASSESSMENT — PAIN DESCRIPTION - LOCATION
LOCATION: ABDOMEN
LOCATION: PELVIS

## 2024-08-10 ASSESSMENT — PAIN SCALES - GENERAL
PAINLEVEL_OUTOF10: 5
PAINLEVEL_OUTOF10: 3
PAINLEVEL_OUTOF10: 5
PAINLEVEL_OUTOF10: 8
PAINLEVEL_OUTOF10: 5

## 2024-08-10 ASSESSMENT — PAIN DESCRIPTION - FREQUENCY
FREQUENCY: CONTINUOUS
FREQUENCY: CONTINUOUS

## 2024-08-10 ASSESSMENT — PAIN DESCRIPTION - ONSET
ONSET: ON-GOING
ONSET: ON-GOING

## 2024-08-10 ASSESSMENT — PAIN DESCRIPTION - ORIENTATION
ORIENTATION: MID;LOWER
ORIENTATION: LOWER
ORIENTATION: MID
ORIENTATION: LOWER

## 2024-08-10 ASSESSMENT — PAIN DESCRIPTION - DESCRIPTORS
DESCRIPTORS: CRAMPING
DESCRIPTORS: ACHING;DISCOMFORT

## 2024-08-10 ASSESSMENT — PAIN DESCRIPTION - PAIN TYPE
TYPE: SURGICAL PAIN
TYPE: SURGICAL PAIN

## 2024-08-10 NOTE — ANESTHESIA PRE PROCEDURE
[facility] Department of Anesthesiology  Pre-Anesthesia Evaluation/Consultation       Name:  Alice Greenberg  : 1985  Age:  38 y.o.                                           MRN:  [id number]  Date: 8/10/2024           Surgeon: Surgeon(s):  Torsten Vogel MD    Procedure: Procedure(s):  DILATATION AND CURETTAGE SUCTION     Allergies   Allergen Reactions    Ibuprofen Dizziness or Vertigo     Patient Active Problem List   Diagnosis    S/P dilatation and curettage     No past medical history on file.  No past surgical history on file.  Social History     Tobacco Use    Smoking status: Never    Smokeless tobacco: Never   Substance Use Topics    Alcohol use: Never    Drug use: Never     Medications  No current facility-administered medications on file prior to encounter.     No current outpatient medications on file prior to encounter.     Current Facility-Administered Medications   Medication Dose Route Frequency Provider Last Rate Last Admin    0.9 % sodium chloride infusion   IntraVENous Continuous Justin Moore  mL/hr at 08/10/24 1337 Restarted at 08/10/24 1356    sodium chloride 0.9 % bolus 1,000 mL  1,000 mL IntraVENous Once Justin Moore MD        0.9 % sodium chloride infusion   IntraVENous PRN Justin Moore MD        miSOPROStol (CYTOTEC) 200 MCG tablet             norepinephrine-sodium chloride (LEVOPHED) 16-0.9 MG/250ML-% infusion             doxycycline (VIBRAMYCIN) 200 mg in sodium chloride 0.9 % 250 mL IVPB  200 mg IntraVENous Once Torsten Vogel MD         Facility-Administered Medications Ordered in Other Encounters   Medication Dose Route Frequency Provider Last Rate Last Admin    ePHEDrine injection   IntraVENous PRN Saumya Couch APRN - CRNA   5 mg at 08/10/24 1340    methylergonovine (METHERGINE) injection   IntraMUSCular PRN Saumya Couch APRN - CRNA   200 mcg at 08/10/24 1351    oxytocin (PITOCIN) injection   IntraVENous PRN Saumya Couch

## 2024-08-10 NOTE — ED NOTES
Pt with another large gush of blood and tissue, Dr Sheets to bedside and vaginal exam performed with more bleeding and clots removed. Blood loss measured at 480 mL at this time for total EBL of 1340 mL. Pt accepted at [facility] called at this time for emergent transfer per Dr. Sheets.

## 2024-08-10 NOTE — ANESTHESIA POSTPROCEDURE EVALUATION
[facility] Department of Anesthesiology  Post-Anesthesia Note       Name:  Alice Greenberg                                  Age:  38 y.o.  MRN:  [id number]     Last Vitals & Oxygen Saturation: BP 99/67   Pulse 77   Temp 98.2 °F (36.8 °C) (Temporal)   Resp 16   Ht 1.651 m (5' 5\")   Wt 98.1 kg (216 lb 4.3 oz)   LMP 04/14/2024   SpO2 100%   BMI 35.99 kg/m²   Patient Vitals for the past 4 hrs:   BP Temp Temp src Pulse Resp SpO2 Height Weight   08/10/24 1454 -- -- -- 77 16 100 % -- --   08/10/24 1448 -- -- -- 79 15 100 % -- --   08/10/24 1445 99/67 -- -- 77 17 100 % -- --   08/10/24 1444 -- -- -- 84 16 100 % -- --   08/10/24 1435 (!) 100/53 -- -- 87 16 100 % -- --   08/10/24 1430 (!) 103/54 -- -- 86 15 100 % -- --   08/10/24 1425 (!) 99/58 -- -- 93 15 100 % -- --   08/10/24 1422 -- -- -- 92 14 100 % -- --   08/10/24 1420 (!) 99/57 -- -- 94 15 100 % -- --   08/10/24 1419 -- -- -- 87 15 100 % -- --   08/10/24 1417 -- -- -- 95 15 100 % -- --   08/10/24 1415 (!) 96/54 -- -- 94 17 100 % -- --   08/10/24 1412 (!) 97/58 98.2 °F (36.8 °C) Temporal (!) 104 15 100 % -- --   08/10/24 1326 (!) 53/34 -- -- 81 25 100 % -- --   08/10/24 1325 -- -- -- (!) 113 20 100 % -- --   08/10/24 1320 -- -- -- (!) 122 15 100 % -- --   08/10/24 1315 107/72 -- -- (!) 126 20 100 % -- --   08/10/24 1310 114/80 -- -- (!) 119 20 100 % -- --   08/10/24 1305 -- -- -- (!) 121 18 100 % -- --   08/10/24 1255 -- -- -- (!) 115 21 100 % -- --   08/10/24 1250 -- -- -- (!) 120 20 100 % -- --   08/10/24 1245 109/87 -- -- (!) 115 23 100 % -- --   08/10/24 1230 106/80 98.4 °F (36.9 °C) Oral (!) 117 21 100 % 1.651 m (5' 5\") 98.1 kg (216 lb 4.3 oz)       Level of consciousness:  Awake, alert to baseline.    Respiratory: Respirations easy, no distress. Stable.    Cardiovascular: Hemodynamically stable.    Hydration: Adequate.    PONV: Adequately managed.    Post-op pain: Adequately controlled.    Post-op assessment: Tolerated anesthetic well without

## 2024-08-10 NOTE — ED PROVIDER NOTES
[facility] EMERGENCY DEPARTMENT  EMERGENCY DEPARTMENT ENCOUNTER        Pt Name: Alice Greenberg  MRN: [id number]  Birthdate 1985  Date of evaluation: 8/10/2024  Provider: Torsten Sheets MD  PCP: No primary care provider on file.  Note Started: 12:05 PM EDT 8/10/24    CHIEF COMPLAINT       Chief Complaint   Patient presents with    Miscarriage     Walked in with , with a pool of blood, upon assessment, 2 separate fetal specimens with significant blood clots noted MD at bedside        HISTORY OF PRESENT ILLNESS: 1 or more Elements     History from : Patient and Family spouse    Limitations to history : Language Kisw[place]ili,  used for entirety of history and physical    Alice Greenberg is a 38 y.o. female who presents with large volume vaginal bleeding.  Patient walked in with her  dripping blood from her pants.  Patient had significant blood clots immediately upon arrival.  Patient is describing pelvic pain, abdominal cramping.  Patient has heavy vaginal bleeding.  Patient thought she was approximately 4 months pregnant however had an ultrasound done 2 days ago that showed concern for early fetal demise and patient was supposed to follow-up with OB/GYN on Tuesday.  Patient is G3, P2.  Patient denies any lightheadedness or dizziness.    Nursing Notes were all reviewed and agreed with or any disagreements were addressed in the HPI.    REVIEW OF SYSTEMS :      Review of Systems   Constitutional: Negative.    HENT:  Negative for congestion and sore throat.    Respiratory: Negative.  Negative for shortness of breath.    Cardiovascular: Negative.  Negative for chest pain.   Gastrointestinal:  Positive for abdominal pain.   Genitourinary:  Positive for pelvic pain and vaginal bleeding.   Musculoskeletal:  Negative for back pain.   Neurological: Negative.  Negative for dizziness, light-headedness and headaches.   Hematological:  Does not bruise/bleed easily.

## 2024-08-10 NOTE — PROGRESS NOTES
4 Eyes Skin Assessment     NAME:  Alice Greenberg  YOB: 1985  MEDICAL RECORD NUMBER:  [id number]    The patient is being assessed for  Admission    I agree that at least one RN has performed a thorough Head to Toe Skin Assessment on the patient. ALL assessment sites listed below have been assessed.      Areas assessed by both nurses:    Head, Face, Ears, Shoulders, Back, Chest, Arms, Elbows, Hands, Sacrum. Buttock, Coccyx, Ischium, Legs. Feet and Heels, and Under Medical Devices         Does the Patient have a Wound? No noted wound(s)       Latrell Prevention initiated by RN: No  Wound Care Orders initiated by RN: No    Pressure Injury (Stage 3,4, Unstageable, DTI, NWPT, and Complex wounds) if present, place Wound referral order by RN under : No    New Ostomies, if present place, Ostomy referral order under : No     Nurse 1 eSignature: Electronically signed by Osei Andujar RN on 8/10/24 at 3:55 PM EDT    **SHARE this note so that the co-signing nurse can place an eSignature**    Nurse 2 eSignature: Electronically signed by Cassandra Saleh RN on 8/10/24 at 4:20 PM EDT

## 2024-08-10 NOTE — ED NOTES
Pt in with active vaginal bleeding, EBL of 300 mL noted in wheelchair when patient moved to bed, pt taken back to bed 3 and Dr. Sheets to bedside

## 2024-08-10 NOTE — OP NOTE
[facility]          [address] [place] [address]                            OPERATIVE REPORT      PATIENT NAME: JAC RENNER          : 1985  MED REC NO: [id number]                      ROOM: [address] [address]  ACCOUNT NO: [id number]                       ADMIT DATE: 08/10/2024  PROVIDER: Torsten Vogle MD      DATE OF PROCEDURE:  08/10/2024    SURGEON:  Torsten Vogel MD    PROCEDURE:  Suction D and C.    PREOPERATIVE DIAGNOSIS:  Incomplete  at approximately 8 to 9 weeks' gestation.    POSTOPERATIVE DIAGNOSIS:  Incomplete  at approximately 8 to 9 weeks' gestation.    ESTIMATED BLOOD LOSS:  100 mL.    COMPLICATIONS:  None.    SPECIMENS:  Products of conception tissue.    ANESTHESIA:  General endotracheal tube.    OPERATIVE INDICATIONS:  The patient is a 38-year-old black,  4, para 3, with incomplete AB who was transferred from [facility] to [facility] for heavy vaginal bleeding due to incomplete AB.    OPERATIVE SUMMARY:  The patient was taken to the operating suite, where she was placed in the dorsal supine position on the operating room table.  Prior to undergoing induction of general endotracheal tube anesthesia, she was readjusted to the dorsal lithotomy position in Joo stirrups.  Vagina was prepped and draped in usual sterile fashion.  Bladder was drained via straight catheter.  Retractors were placed in the vagina.  Single-tooth tenaculum was used to grasp the anterior lip of the cervix.  The patient had a large amount of products of conception tissue stuck at the external os and this was retrieved with ring forceps and passed off the operative field for specimen.  A 10 mm suction cannula was then easily passed up to the endocervical canal up to the top of the intrauterine cavity.  This was rotated in a 360-degree radius and suction was applied evacuating the uterus of any remaining blood clots and/or tissue.

## 2024-08-10 NOTE — PROGRESS NOTES
Discharge instructions gone over with pt and spouse, both verbalized understanding. IV removed w/o complications. Pt transported via wheelchair by PCA and RN. Electronically signed by Osei Andujar RN on 8/10/2024 at 6:56 PM

## 2024-08-10 NOTE — ED NOTES
Spoke with Arlet, ER charge at [facility] to give report. Pt left via 911 with [facility], estimated blood loss 1340. Product of conception taken to pathology via this RN.

## 2024-08-10 NOTE — PROGRESS NOTES
Vaginal Sweep Documentation     Vaginal prep sponge count performed by SHAVON Becker RN and CHRISTOPHER Saavedra ST. Count correct.   Vaginal sweep performed by CHRISTOPHER Saavedra at 1355. No foreign objects or vaginal tears noted.

## 2024-08-10 NOTE — ED PROVIDER NOTES
[facility] OR  eMERGENCY dEPARTMENT eNCOUnter      Pt Name: Alice Greenberg  MRN: [id number]  Birthdate 1985  Date of evaluation: 8/10/2024  Provider: FRANKIE CHAUDHARI MD    CHIEF COMPLAINT       Chief Complaint   Patient presents with    Abdominal Pain    Vaginal Bleeding     Pt arrives with ems from [facility] ed for c/o vaginal bleeding during pregnancy. She reports abd pain 5/10. Pt reports it starting on Thursday.          CRITICAL CARE TIME   Total Critical Care time was a minimum of 35 minutes, excluding separately reportable procedures.  There was a high probability of clinically significant/life threatening deterioration in the patient's condition which required my urgent intervention.  Critical care time includes my initial evaluation, ongoing bedside care and reassessment, review of old records, review of today's ED record at [facility], conversations with Dr. Sheets from [facility], conversation with anesthesia and OB/GYN.  No procedure time was included.      HISTORY OF PRESENT ILLNESS  (Location/Symptom, Timing/Onset, Context/Setting, Quality, Duration, Modifying Factors, Severity.)   History From: Dr. Sheets / Patient  Limitations to history : Language Citizen of [place]    Alice Greenberg is a 38 y.o. female who presents to the emergency department via life squad as a 911 transfer from [facility] emergency department with heavy vaginal bleeding.  This patient was seen on 2024 in the emergency department.  She was diagnosed with a missed .  A gestational sac and fetal pole and yolk sac were seen but no fetal activity.  She presented to [facility] with heavy vaginal bleeding.  Patient is a G3, P2.  Per Dr. Sheets and reports she was tachycardic but was not hypotensive.  She had been given TXA.  He had been in contact with GYN here at [facility] and they have accepted the patient for transfer to our ED and will evaluate here in the ED.  She was noted to pass a large  to being taken to the operating room she suddenly became diaphoretic.  Her blood pressure dropped into the 50s.  She had 2 IV sites both with normal saline hanging.  I had them pressure bags normal saline in.  Her 2 units of red blood cells are ready.  Someone was sent to the lab to pick these up and they will take him directly to the OR as the patient is being wheeled to the OR at this time.  Anesthesia is at the bedside.      I am the Primary Clinician of Record.      PROCEDURES:  None    FINAL IMPRESSION      1. Incomplete     2. Vaginal bleeding in pregnancy    3. Missed ab          DISPOSITION/PLAN   DISPOSITION Decision To Admit 08/10/2024 12:42:54 PM      PATIENT REFERRED TO:  No follow-up provider specified.    DISCHARGE MEDICATIONS:  There are no discharge medications for this patient.      (Please note that portions of this note were completed with a voice recognition program.  Efforts were made to edit the dictations but occasionally words are mis-transcribed.)    JUSTIN CHAUDHARI MD  Attending Emergency Physician        Justin Chaudhari MD  08/10/24 [id number]

## 2024-08-10 NOTE — DISCHARGE INSTRUCTIONS
Follow up with [facility] clinic for Post-op 1-2 weeks    [facility]  [address], [place] [address] [phone]

## 2024-08-10 NOTE — H&P
[facility]          [address] [place] [address]                            PREOPERATIVE H&P      PATIENT NAME: JAC RENNER          : 1985  MED REC NO: [id number]                      ROOM: 007  ACCOUNT NO: [id number]                       ADMIT DATE: 08/10/2024  PROVIDER: Torsten Vogel MD      PLANNED PROCEDURE:  Suction D and C.    PREOPERATIVE DIAGNOSIS:  Incomplete AB.    HISTORY:  The patient is a 38-year-old black,  4, para 3, who presented to [facility] Emergency room after being transferred from an outside hospital for heavy vaginal bleeding, for incomplete AB.  The patient has a history of having an ultrasound on 2024, which found an intrauterine fetal demise at 8.3 weeks' gestation with crown rump length of 1.8.  No cardiac activity was seen, and the patient was to follow up with outside GYN for probable suction D and C to be scheduled.  However, she states that her bleeding started about 2 days ago, and presented to the emergency room with heavy vaginal bleeding today.  She was transferred to [facility] for evaluation, and she was passing large blood clots per vagina consistent with incomplete AB.  Through the benefit of an , I have counseled her concerning the risks of the surgery itself including possible damage to her bowel and her bladder and her ureters, any of which could result in more extensive surgery.  She does also give consent for blood transfusion if necessary.  Her H and H at the transferring hospital were 11.2 and 35.1% respectively.  Her H and H here are pending at the present time.    PAST MEDICAL HISTORY:  Negative.    PAST SURGICAL HISTORY:  Includes appendectomy in .    ALLERGIES:  IBUPROFEN CAUSES \"PALPITATIONS.\"      MEDICATIONS:  None.    SOCIAL HISTORY:  Unremarkable.    FAMILY HISTORY:  Family medical history is negative for blood disorders or any type of cancer.    OB/GYN  HISTORY:  She is a  4, para 3, status post 3 spontaneous vaginal deliveries in , , .    PHYSICAL EXAMINATION:  VITAL SIGNS:  5 feet 5 inches tall, 98.1 kg or 216 pounds, pleasant, black female, not in acute distress.   HEENT:  Within normal limits.   NECK:  Supple without evidence of thyromegaly or adenopathy.   LUNGS:  Clear to auscultation.   HEART:  Regular rate and rhythm without murmurs, gallops, or rubs.  ABDOMEN:  Soft, nontender.  No masses.  No hepatosplenomegaly.  No CVAT.  No hernias.  EXTREMITIES:  Without evidence of clubbing, cyanosis, or edema.  NEUROLOGIC:  The patient is grossly intact with 2+ DTRs with 0 clonus.  Alert and oriented x3.  PELVIC:  External genitalia normal.  Vagina was full of blood clot.  Cervix was unable to visualize with the speculum.  However, bimanual exam found the cervix to be 1 cm dilated, and uterus to be approximately 8-week size, mobile in midline, nontender.  Adnexal exam with no masses and nontender.  Rectovaginal exam was deferred.    ASSESSMENT/PLAN:  This is a 38-year-old multiparous female, now with incomplete AB of the 1st trimester with heavy vaginal bleeding.  We will proceed with suction D and C immediately here at [facility].          LACY BARRY MD      D:  08/10/2024 13:03:34     T:  08/10/2024 13:48:06     [facility]/FABIAN  Job #:  [id number]     Doc#:  [id number]

## 2024-08-10 NOTE — ED NOTES
Pt gold necklace, gold ring, gold earrings placed in to labeled container and provided to spouse.

## 2024-08-10 NOTE — CONSENT
Informed Consent for Blood Component Transfusion Note    I have discussed with the patient the rationale for blood component transfusion; its benefits in treating or preventing fatigue, organ damage, or death; and its risk which includes mild transfusion reactions, rare risk of blood borne infection, or more serious but rare reactions. I have discussed the alternatives to transfusion, including the risk and consequences of not receiving transfusion. The patient had an opportunity to ask questions and had agreed to proceed with transfusion of blood components.    Electronically signed by FRANKIE CHAUDHARI MD on 8/10/24 at 12:33 PM EDT

## 2024-08-11 LAB
BLOOD BANK DISPENSE STATUS: NORMAL
BLOOD BANK DISPENSE STATUS: NORMAL
BLOOD BANK PRODUCT CODE: NORMAL
BLOOD BANK PRODUCT CODE: NORMAL
BPU ID: NORMAL
BPU ID: NORMAL
DESCRIPTION BLOOD BANK: NORMAL
DESCRIPTION BLOOD BANK: NORMAL

## 2025-03-02 ENCOUNTER — HOSPITAL ENCOUNTER (EMERGENCY)
Age: 40
Discharge: HOME | End: 2025-03-02
Payer: COMMERCIAL

## 2025-03-02 VITALS
SYSTOLIC BLOOD PRESSURE: 150 MMHG | RESPIRATION RATE: 16 BRPM | BODY MASS INDEX: 35.99 KG/M2 | HEART RATE: 82 BPM | DIASTOLIC BLOOD PRESSURE: 90 MMHG | OXYGEN SATURATION: 95 % | HEIGHT: 65 IN | TEMPERATURE: 97.7 F | WEIGHT: 216 LBS

## 2025-03-02 DIAGNOSIS — J06.9 ACUTE UPPER RESPIRATORY INFECTION: Primary | ICD-10-CM

## 2025-03-02 DIAGNOSIS — K13.0 LIP LESION: ICD-10-CM

## 2025-03-02 DIAGNOSIS — L02.413 ABSCESS OF RIGHT ARM: ICD-10-CM

## 2025-03-02 DIAGNOSIS — R68.83 CHILLS: ICD-10-CM

## 2025-03-02 LAB
FLUAV RNA RESP QL NAA+PROBE: NOT DETECTED
FLUBV RNA RESP QL NAA+PROBE: NOT DETECTED
SARS-COV-2 RNA RESP QL NAA+PROBE: NOT DETECTED

## 2025-03-02 PROCEDURE — 6370000000 HC RX 637 (ALT 250 FOR IP): Performed by: NURSE PRACTITIONER

## 2025-03-02 PROCEDURE — 99283 EMERGENCY DEPT VISIT LOW MDM: CPT

## 2025-03-02 PROCEDURE — 87636 SARSCOV2 & INF A&B AMP PRB: CPT

## 2025-03-02 RX ORDER — ACETAMINOPHEN 500 MG
500 TABLET ORAL 4 TIMES DAILY PRN
Qty: 360 TABLET | Refills: 1 | Status: SHIPPED | OUTPATIENT
Start: 2025-03-02

## 2025-03-02 RX ORDER — MUPIROCIN 20 MG/G
OINTMENT TOPICAL
Qty: 15 G | Refills: 0 | Status: SHIPPED | OUTPATIENT
Start: 2025-03-02 | End: 2025-03-09

## 2025-03-02 RX ORDER — FOLIC ACID 1 MG/1
1 TABLET ORAL DAILY
COMMUNITY
Start: 2024-08-13 | End: 2025-08-13

## 2025-03-02 RX ORDER — ACETAMINOPHEN 500 MG
1000 TABLET ORAL ONCE
Status: COMPLETED | OUTPATIENT
Start: 2025-03-02 | End: 2025-03-02

## 2025-03-02 RX ORDER — DOCOSANOL 100 MG/G
1 CREAM TOPICAL
Qty: 2 G | Refills: 0 | Status: SHIPPED | OUTPATIENT
Start: 2025-03-02 | End: 2025-03-12

## 2025-03-02 RX ORDER — BACITRACIN ZINC AND POLYMYXIN B SULFATE 500; 10000 [USP'U]/G; [USP'U]/G
OINTMENT TOPICAL ONCE
Status: COMPLETED | OUTPATIENT
Start: 2025-03-02 | End: 2025-03-02

## 2025-03-02 RX ADMIN — ACETAMINOPHEN 1000 MG: 500 TABLET ORAL at 19:22

## 2025-03-02 RX ADMIN — BACITRACIN ZINC AND POLYMYXIN B SULFATE: at 19:27

## 2025-03-02 ASSESSMENT — PAIN - FUNCTIONAL ASSESSMENT: PAIN_FUNCTIONAL_ASSESSMENT: NONE - DENIES PAIN

## 2025-03-03 NOTE — ED PROVIDER NOTES
[facility] EMERGENCY DEPARTMENT  EMERGENCY DEPARTMENT ENCOUNTER        Pt Name: Alice Greenberg  MRN: [id number]  Birthdate 1985  Date of evaluation: 3/2/2025  Provider: VIVIAN De La Cruz CNP  PCP: No primary care provider on file.  Note Started: 7:54 PM EST 3/2/25      WILL. I have evaluated this patient.        CHIEF COMPLAINT       Chief Complaint   Patient presents with    Fever     Pt felt warm and had chills x 1 wk       HISTORY OF PRESENT ILLNESS: 1 or more Elements     History From: patient  Limitations to history : None    Alice Greenberg is a 39 y.o. female who presents to the emergency department tonight with multiple complaints.  The patient is complaining of fever, chills, body aches, cold sore to the corner of the right lip.  Also complaining of a small abscess that is draining to the right upper arm.  Onset of symptoms about a week.  No mitigating exacerbating factors.      Denies any lightheadedness, dizziness, visual disturbances.  No chest pain or pressure.  No neck or back pain.  No abdominal pain, nausea, vomiting, diarrhea, constipation, or dysuria.      Nursing Notes were all reviewed and agreed with or any disagreements were addressed in the HPI.    REVIEW OF SYSTEMS :      Review of Systems   Constitutional:  Positive for chills, fatigue and fever. Negative for activity change.   Respiratory:  Negative for chest tightness and shortness of breath.    Cardiovascular:  Negative for chest pain.   Gastrointestinal:  Negative for abdominal pain, diarrhea, nausea and vomiting.   Genitourinary:  Negative for dysuria.   Musculoskeletal:  Positive for myalgias.   Skin:  Positive for color change.   Neurological:  Positive for headaches.   All other systems reviewed and are negative.      Positives and Pertinent negatives as per HPI.     SURGICAL HISTORY     Past Surgical History:   Procedure Laterality Date    DILATION AND CURETTAGE OF UTERUS N/A 8/10/2024    DILATATION

## (undated) DEVICE — TRANSFER SET 3": Brand: MEDLINE INDUSTRIES, INC.

## (undated) DEVICE — PAD,NON-ADHERENT,3X8,STERILE,LF,1/PK: Brand: MEDLINE

## (undated) DEVICE — SOLUTION IRRIG 1000ML 0.9% SOD CHL USP POUR PLAS BTL

## (undated) DEVICE — SOLUTION SCRB 4OZ 7.5% POVIDONE IOD ANTIMIC BTL

## (undated) DEVICE — SOLUTION PREP PAINT POV IOD FOR SKIN MUCOUS MEM

## (undated) DEVICE — TRAP TISS DISP FOR COLL SYS BERK SAFETOUCH

## (undated) DEVICE — MERCY HEALTH WEST TURNOVER: Brand: MEDLINE INDUSTRIES, INC.

## (undated) DEVICE — PREMIUM DRY TRAY LF: Brand: MEDLINE INDUSTRIES, INC.

## (undated) DEVICE — COVER LT HNDL BLU PLAS

## (undated) DEVICE — STRAP RESTRN W3.5XL18IN STD ALL PURP DISP W/ SLNG RNG

## (undated) DEVICE — CATHETER,URETHRAL,REDRUBBER,STRL,16FR: Brand: MEDLINE

## (undated) DEVICE — SET COLL TBNG L6FT DIA3/8IN W/ INTEGR SWVL HNDL SLIP RNG M

## (undated) DEVICE — ELECTRODE PT RET AD L9FT HI MOIST COND ADH HYDRGEL CORDED

## (undated) DEVICE — Z DISCONTINUED NO SUB IDED CURETTE VAC CVD 7MM

## (undated) DEVICE — CURETTE SURG VAC 10 MM CRV RIGID PLAS